# Patient Record
Sex: MALE | Race: WHITE
[De-identification: names, ages, dates, MRNs, and addresses within clinical notes are randomized per-mention and may not be internally consistent; named-entity substitution may affect disease eponyms.]

---

## 2020-06-13 ENCOUNTER — HOSPITAL ENCOUNTER (EMERGENCY)
Dept: HOSPITAL 1 - ED | Age: 50
Discharge: HOME | End: 2020-06-13
Payer: SELF-PAY

## 2020-06-13 VITALS — SYSTOLIC BLOOD PRESSURE: 122 MMHG | DIASTOLIC BLOOD PRESSURE: 47 MMHG

## 2020-06-13 VITALS
HEIGHT: 62 IN | WEIGHT: 189 LBS | WEIGHT: 189 LBS | HEIGHT: 62 IN | BODY MASS INDEX: 34.78 KG/M2 | BODY MASS INDEX: 34.78 KG/M2

## 2020-06-13 DIAGNOSIS — Z20.828: ICD-10-CM

## 2020-06-13 DIAGNOSIS — J18.9: Primary | ICD-10-CM

## 2020-06-13 LAB
ALBUMIN SERPL-MCNC: 3.6 G/DL (ref 3.4–5)
ALP SERPL-CCNC: 52 U/L (ref 46–116)
ALT SERPL-CCNC: 40 U/L (ref 16–63)
AST SERPL-CCNC: 32 U/L (ref 15–37)
BASOPHILS NFR BLD: 0.5 % (ref 0–2)
BILIRUB SERPL-MCNC: 1.1 MG/DL (ref 0.2–1)
BUN SERPL-MCNC: 16 MG/DL (ref 7–18)
CALCIUM SERPL-MCNC: 8.9 MG/DL (ref 8.5–10.1)
CHLORIDE SERPL-SCNC: 94 MMOL/L (ref 98–107)
CO2 SERPL-SCNC: 25.1 MMOL/L (ref 21–32)
CREAT SERPL-MCNC: 1.2 MG/DL (ref 0.7–1.3)
ERYTHROCYTE [DISTWIDTH] IN BLOOD BY AUTOMATED COUNT: 13 % (ref 11.5–14.5)
GFR SERPLBLD BASED ON 1.73 SQ M-ARVRAT: > 60 ML/MIN
GLUCOSE SERPL-MCNC: 109 MG/DL (ref 74–106)
PLATELET # BLD: 226 X10^3MCL (ref 130–400)
POTASSIUM SERPL-SCNC: 4 MMOL/L (ref 3.5–5.1)
PROT SERPL-MCNC: 8.7 G/DL (ref 6.4–8.2)
SODIUM SERPL-SCNC: 130 MMOL/L (ref 136–145)

## 2020-06-20 ENCOUNTER — HOSPITAL ENCOUNTER (INPATIENT)
Dept: HOSPITAL 1 - ED | Age: 50
LOS: 3 days | Discharge: HOME | DRG: 193 | End: 2020-06-23
Attending: FAMILY MEDICINE | Admitting: FAMILY MEDICINE
Payer: SELF-PAY

## 2020-06-20 VITALS
HEIGHT: 66 IN | HEIGHT: 66 IN | WEIGHT: 168 LBS | BODY MASS INDEX: 27 KG/M2 | WEIGHT: 168 LBS | BODY MASS INDEX: 27 KG/M2

## 2020-06-20 VITALS — DIASTOLIC BLOOD PRESSURE: 80 MMHG | SYSTOLIC BLOOD PRESSURE: 128 MMHG

## 2020-06-20 VITALS — DIASTOLIC BLOOD PRESSURE: 85 MMHG | SYSTOLIC BLOOD PRESSURE: 133 MMHG

## 2020-06-20 VITALS — DIASTOLIC BLOOD PRESSURE: 82 MMHG | SYSTOLIC BLOOD PRESSURE: 135 MMHG

## 2020-06-20 DIAGNOSIS — E87.1: ICD-10-CM

## 2020-06-20 DIAGNOSIS — J96.01: ICD-10-CM

## 2020-06-20 DIAGNOSIS — Z20.828: ICD-10-CM

## 2020-06-20 DIAGNOSIS — E66.8: ICD-10-CM

## 2020-06-20 DIAGNOSIS — E86.0: ICD-10-CM

## 2020-06-20 DIAGNOSIS — Z79.01: ICD-10-CM

## 2020-06-20 DIAGNOSIS — E78.5: ICD-10-CM

## 2020-06-20 DIAGNOSIS — D64.9: ICD-10-CM

## 2020-06-20 DIAGNOSIS — E44.1: ICD-10-CM

## 2020-06-20 DIAGNOSIS — I10: ICD-10-CM

## 2020-06-20 DIAGNOSIS — E11.9: ICD-10-CM

## 2020-06-20 DIAGNOSIS — J18.9: Primary | ICD-10-CM

## 2020-06-20 LAB
ALBUMIN SERPL-MCNC: 3.2 G/DL (ref 3.4–5)
ALP SERPL-CCNC: 91 U/L (ref 46–116)
ALT SERPL-CCNC: 80 U/L (ref 16–63)
AST SERPL-CCNC: 36 U/L (ref 15–37)
BASOPHILS NFR BLD: 0.3 % (ref 0–2)
BILIRUB SERPL-MCNC: 0.6 MG/DL (ref 0.2–1)
BUN SERPL-MCNC: 19 MG/DL (ref 7–18)
CALCIUM SERPL-MCNC: 9.2 MG/DL (ref 8.5–10.1)
CHLORIDE SERPL-SCNC: 98 MMOL/L (ref 98–107)
CHOLEST SERPL-MCNC: 141 MG/DL (ref ?–200)
CHOLEST/HDLC SERPL: 9.4 MG/DL
CO2 SERPL-SCNC: 23.7 MMOL/L (ref 21–32)
CREAT SERPL-MCNC: 1.1 MG/DL (ref 0.7–1.3)
CRP SERPL-MCNC: 4.9 MG/DL (ref ?–0.9)
ERYTHROCYTE [DISTWIDTH] IN BLOOD BY AUTOMATED COUNT: 13.4 % (ref 11.5–14.5)
GFR SERPLBLD BASED ON 1.73 SQ M-ARVRAT: > 60 ML/MIN
GLUCOSE SERPL-MCNC: 159 MG/DL (ref 74–106)
HDLC SERPL-MCNC: 15 MG/DL (ref 40–60)
LDH SERPL-CCNC: 251 U/L (ref 100–190)
MICROSCOPIC UR-IMP: NO
PLATELET # BLD: 530 X10^3MCL (ref 130–400)
POTASSIUM SERPL-SCNC: 4.7 MMOL/L (ref 3.5–5.1)
PROT SERPL-MCNC: 8.9 G/DL (ref 6.4–8.2)
RBC # UR STRIP.AUTO: NEGATIVE /UL
SODIUM SERPL-SCNC: 133 MMOL/L (ref 136–145)
TRIGL SERPL-MCNC: 194 MG/DL (ref ?–150)
UA SPECIFIC GRAVITY: 1.02 (ref 1–1.03)

## 2020-06-20 PROCEDURE — G0378 HOSPITAL OBSERVATION PER HR: HCPCS

## 2020-06-20 NOTE — NUR
**PLEASE ENTER FULL NAMES OF LVN/RN**
 
          Patient data collected by (LVN):PENG YAP
Assessment reviewed and completed by (RN):CORI UGALDE

## 2020-06-20 NOTE — NUR
PT IN BED AWAKE, ALERT, VERBALLY RESPONSIVE. ON TELE 3 NSR. DENIES CHEST
PAIN/PRESSURE. ON DROPLET ISOLATION FOR R/O COVID, PENDING RESULT. ON RA, IN
NO ACUTE RESPIRATORY DISTRESS, DENIES SOB. IV SITE TO RFA. DENIES PAIN OR
DISCOMFORT AT THIS TIME. BED IN LOWEST POSITION. CALL LIGHT WITHIN REACH. WILL
ENDORSE TO INCOMING SHIFT.

## 2020-06-20 NOTE — NUR
RECEIVED PT IN BED AWAKE, ALERT,ORIENTED X4. LUNG SOUNDS DIMINISHED AT THE
BASES. PT DENIED HAVING SOB ON ROOM AIR. HE HAS NO C/O PAIN. IV SITE TO RTAC
W/ NO S/S OF INFILTRATION. CALL LIGHT W/IN REACH.

## 2020-06-20 NOTE — NUR
CALLED TO TENT TO SCREEN PT. PT WEARING OWN MASK. PT WAS WEEN HERE 6/13 & DX W/
PNEUMONIA. PT WENT TO DR. JETER YESTERDAY & HAS REFERRAL SLIP TO COME TO ER "R/O
PULMONARY EMBOLISM, DX: SOB, PULSE OX 92%, TACHYCARDIA, 5 DAYS 
PRIOR TO SOB, ON EXAM: BILAT LUNGS CLEAR." PT ALSO AS ORDER SLIPS FROM SAME MD
ALSO FROM 6/19: "BILATERAL LE VENOUS DOPPER TO R/O DVT, DX: SOB, R/O PE" &
ANOTHER ONE FOR "LAB CBC, CMP, D-DIMER, CRP"
PT'S ORAL TEMP 98.2, HR PER WWCJS=132, ROOM AIR SAT 94%. KOBPQF=948#.
PT ESCORTED TO BED 15 & PLACED IN COVID PRECAUTIONS.
I RESPONDED WEARING HAIR COVER, N-95 MASK, & GLOVES.

## 2020-06-20 NOTE — NUR
RECEIVED PT FROM ER, PT ADMIT FOR COVID, PNA, PT IS A/O X4, VERBAL RESPONSIVE.
LUNG SOUND DIM SAMEER BASE, DENY ANY SOB. C/O DRY COUGH. PO2 95% IN ROOM AIR. PT
IS ON TELE 3, NSR. DENY ANY CHEST PAIN, BOWEL SOUND PRESENT ALL 4 QUADRANTS,
NO DISTENTION, ON TENDER. PEDAL PULSE PRESENT BOTH FEET, NO EDEMA, IV AT RIGHT
AC, NO LEAKING, NO INFILTRATION. ALL ADLS ASSIST, ALL NEED MET, CALL LIGHT IN
REACH, WILL CONTINUE TO MONITOR. PT CONTINUE ON DROPLET ISOLATION.

## 2020-06-21 VITALS — DIASTOLIC BLOOD PRESSURE: 63 MMHG | SYSTOLIC BLOOD PRESSURE: 114 MMHG

## 2020-06-21 VITALS — SYSTOLIC BLOOD PRESSURE: 131 MMHG | DIASTOLIC BLOOD PRESSURE: 79 MMHG

## 2020-06-21 VITALS — DIASTOLIC BLOOD PRESSURE: 72 MMHG | SYSTOLIC BLOOD PRESSURE: 124 MMHG

## 2020-06-21 VITALS — DIASTOLIC BLOOD PRESSURE: 81 MMHG | SYSTOLIC BLOOD PRESSURE: 140 MMHG

## 2020-06-21 LAB
ALBUMIN SERPL-MCNC: 2.9 G/DL (ref 3.4–5)
ALP SERPL-CCNC: 73 U/L (ref 46–116)
ALT SERPL-CCNC: 68 U/L (ref 16–63)
AST SERPL-CCNC: 31 U/L (ref 15–37)
BASOPHILS NFR BLD: 0.4 % (ref 0–2)
BILIRUB SERPL-MCNC: 0.5 MG/DL (ref 0.2–1)
BUN SERPL-MCNC: 18 MG/DL (ref 7–18)
CALCIUM SERPL-MCNC: 8.9 MG/DL (ref 8.5–10.1)
CHLORIDE SERPL-SCNC: 101 MMOL/L (ref 98–107)
CO2 SERPL-SCNC: 27.1 MMOL/L (ref 21–32)
CREAT SERPL-MCNC: 1 MG/DL (ref 0.7–1.3)
ERYTHROCYTE [DISTWIDTH] IN BLOOD BY AUTOMATED COUNT: 13.8 % (ref 11.5–14.5)
GFR SERPLBLD BASED ON 1.73 SQ M-ARVRAT: > 60 ML/MIN
GLUCOSE SERPL-MCNC: 100 MG/DL (ref 74–106)
MAGNESIUM SERPL-MCNC: 2.1 MG/DL (ref 1.8–2.4)
PHOSPHATE SERPL-MCNC: 4.1 MG/DL (ref 2.5–4.9)
PLATELET # BLD: 490 X10^3MCL (ref 130–400)
POTASSIUM SERPL-SCNC: 4.4 MMOL/L (ref 3.5–5.1)
PROT SERPL-MCNC: 7.9 G/DL (ref 6.4–8.2)
SODIUM SERPL-SCNC: 137 MMOL/L (ref 136–145)

## 2020-06-21 NOTE — NUR
RECEIVED PATIENT. IN BED, SLEEPING. NO ACUTE RESP DISTRESS NOTED. NO
COMPLAINTS OF PAIN. IV INTACT AND PATENT. SAFETY PREC IN PLACE. CALL LIGHT
WITHIN REACH. WILL CONTINUE TO MONITOR.

## 2020-06-21 NOTE — NUR
DR. HERNANDEZ MADE AWARE REGARDING BLOOD SUGAR 353 AND PATIENT WOULD LIKE TO KNOW
MORE INFORMATION REGARDING HIGH BLOOD SUGAR. PER DR. HERNANDEZ, HE WILL SPEAK
WITH THE PATIENT. WILL CONTINUE TO MONITOR.

## 2020-06-21 NOTE — NUR
PT SLEPT THROUGH THE NIGHT. HE DENIED HAVING SOB . HE REMAINS ON ROOM AIR AND
SATTING 96% AT THIS TIME. HE HAD NO C/O PAIN. ALL NEEDS ATTENDED TO.

## 2020-06-21 NOTE — NUR
SPOKE WITH DR. BRAY REGARDING PATIENT LAB VALUES CRP 3.2, ALT 68, DDIMER
834, . DR. BRAY MADE AWARE REGARDING SCHEDULED CT- ANGIO PULM ORDERED
BY DR. DOWELL. NO NEW ORDERS AT THIS TIME. WILL CONTINUE TO MONITOR.

## 2020-06-21 NOTE — NUR
RECEIVED PT IN BED RESTING QUIETLY. HE IS ALERT,ORIENTED X4. LUNG SOUNDS CLEAR
BUT DIMINISHED AT THE BASES. HE DENIED HAVING SOB. HE DENIED CHEST PAIN. PT IS
ON ROOM AIR AND SATTING 97%. W/ IV  TO RTAC INTACT AND PATENT. CALL LIGHT W/IN
REACH.

## 2020-06-22 VITALS — SYSTOLIC BLOOD PRESSURE: 148 MMHG | DIASTOLIC BLOOD PRESSURE: 87 MMHG

## 2020-06-22 VITALS — SYSTOLIC BLOOD PRESSURE: 130 MMHG | DIASTOLIC BLOOD PRESSURE: 86 MMHG

## 2020-06-22 VITALS — DIASTOLIC BLOOD PRESSURE: 75 MMHG | SYSTOLIC BLOOD PRESSURE: 120 MMHG

## 2020-06-22 VITALS — SYSTOLIC BLOOD PRESSURE: 133 MMHG | DIASTOLIC BLOOD PRESSURE: 84 MMHG

## 2020-06-22 VITALS — SYSTOLIC BLOOD PRESSURE: 120 MMHG | DIASTOLIC BLOOD PRESSURE: 75 MMHG

## 2020-06-22 VITALS — SYSTOLIC BLOOD PRESSURE: 126 MMHG | DIASTOLIC BLOOD PRESSURE: 76 MMHG

## 2020-06-22 LAB
ALBUMIN SERPL-MCNC: 3.2 G/DL (ref 3.4–5)
ALP SERPL-CCNC: 73 U/L (ref 46–116)
ALT SERPL-CCNC: 66 U/L (ref 16–63)
AST SERPL-CCNC: 30 U/L (ref 15–37)
BASOPHILS NFR BLD: 0.4 % (ref 0–2)
BILIRUB SERPL-MCNC: 0.5 MG/DL (ref 0.2–1)
BUN SERPL-MCNC: 19 MG/DL (ref 7–18)
CALCIUM SERPL-MCNC: 9.2 MG/DL (ref 8.5–10.1)
CHLORIDE SERPL-SCNC: 99 MMOL/L (ref 98–107)
CO2 SERPL-SCNC: 23.2 MMOL/L (ref 21–32)
CREAT SERPL-MCNC: 1.1 MG/DL (ref 0.7–1.3)
ERYTHROCYTE [DISTWIDTH] IN BLOOD BY AUTOMATED COUNT: 13.8 % (ref 11.5–14.5)
GFR SERPLBLD BASED ON 1.73 SQ M-ARVRAT: > 60 ML/MIN
GLUCOSE SERPL-MCNC: 117 MG/DL (ref 74–106)
LIPASE SERPL-CCNC: 266 IU/L (ref 73–393)
MAGNESIUM SERPL-MCNC: 2.2 MG/DL (ref 1.8–2.4)
PHOSPHATE SERPL-MCNC: 4.9 MG/DL (ref 2.5–4.9)
PLATELET # BLD: 470 X10^3MCL (ref 130–400)
POTASSIUM SERPL-SCNC: 4.3 MMOL/L (ref 3.5–5.1)
PROT SERPL-MCNC: 8.3 G/DL (ref 6.4–8.2)
SODIUM SERPL-SCNC: 136 MMOL/L (ref 136–145)

## 2020-06-22 NOTE — NUR
RESTING IN NO ACUTE DISTRESS. NO SOB NOTED, NO DRY COUGH. O2 SATS 98% ON RA.
VS  STABLE. HL PATENT. NO C/O PAIN OR DISCOMFORT AT THIS TIME. CALL LIGHT
WITHIN REACH.

## 2020-06-22 NOTE — NUR
PT STATED HE HAD A RESTFUL NIGHT. HE DENIED HAVING SOB . HE HAD NO C/O PAIN.
PT COUGHING RARELY. HE REMAINS ON ROOM AIR AND SATTING 94%. ALL NEEDS ATTENDED
TO.

## 2020-06-22 NOTE — NUR
DISCOUNT PHARMACY CARD AND LIST TO [facility] GIVEN TO GUICHO URIZ AND SHE WILL HAND IT PATIENT.

## 2020-06-23 VITALS — DIASTOLIC BLOOD PRESSURE: 85 MMHG | SYSTOLIC BLOOD PRESSURE: 127 MMHG

## 2020-06-23 VITALS — DIASTOLIC BLOOD PRESSURE: 79 MMHG | SYSTOLIC BLOOD PRESSURE: 122 MMHG

## 2020-06-23 VITALS — DIASTOLIC BLOOD PRESSURE: 83 MMHG | SYSTOLIC BLOOD PRESSURE: 142 MMHG

## 2020-06-23 VITALS — DIASTOLIC BLOOD PRESSURE: 84 MMHG | SYSTOLIC BLOOD PRESSURE: 133 MMHG

## 2020-06-23 LAB
ALBUMIN SERPL-MCNC: 3.2 G/DL (ref 3.4–5)
ALP SERPL-CCNC: 71 U/L (ref 46–116)
ALT SERPL-CCNC: 80 U/L (ref 16–63)
AST SERPL-CCNC: 38 U/L (ref 15–37)
BASOPHILS NFR BLD: 0.4 % (ref 0–2)
BILIRUB SERPL-MCNC: 0.62 MG/DL (ref 0.2–1)
BUN SERPL-MCNC: 19 MG/DL (ref 7–18)
CALCIUM SERPL-MCNC: 9.4 MG/DL (ref 8.5–10.1)
CHLORIDE SERPL-SCNC: 100 MMOL/L (ref 98–107)
CO2 SERPL-SCNC: 23 MMOL/L (ref 21–32)
CREAT SERPL-MCNC: 1 MG/DL (ref 0.7–1.3)
CRP SERPL-MCNC: 2.1 MG/DL (ref ?–0.9)
ERYTHROCYTE [DISTWIDTH] IN BLOOD BY AUTOMATED COUNT: 13.8 % (ref 11.5–14.5)
GFR SERPLBLD BASED ON 1.73 SQ M-ARVRAT: > 60 ML/MIN
GLUCOSE SERPL-MCNC: 122 MG/DL (ref 74–106)
MAGNESIUM SERPL-MCNC: 2.2 MG/DL (ref 1.8–2.4)
PHOSPHATE SERPL-MCNC: 4 MG/DL (ref 2.5–4.9)
PLATELET # BLD: 477 X10^3MCL (ref 130–400)
POTASSIUM SERPL-SCNC: 4.3 MMOL/L (ref 3.5–5.1)
PROT SERPL-MCNC: 8.4 G/DL (ref 6.4–8.2)
SODIUM SERPL-SCNC: 134 MMOL/L (ref 136–145)

## 2020-06-23 NOTE — NUR
DISCHARGE DONE. REVIEWED DISCHARGE PACKET, F/U INSTRUCTIONS AND DIABETIC
EDUCATIONAL PACKET W/ PT, PT VERBALIZED UNDERSTANDING OF INSTRUCTIONS. PT
AOX4, RESP E/U ON RA, VS STABLE, DENIES PAIN AT THIS TIME. IV TO RAC REMOVED,
CATH INTACT, GAUZE APPLIED TO SITE. TELE 3 COLLECTED AND RETURNED TO MONITOR
TECH. PT ESCORTED TO LOBBY VIA WHEELCHAIR W/ NO ACUTE INCIDENCE.

## 2020-06-23 NOTE — NUR
PT SEEN AT BEDSIDE. PT AOX4, FOLLOWS COMMANDS SPEECH CLEAR, PERRLA, DENIES
HEADACHE. ON TELE 3 SHOWING SR, HR: 73, S1 AND S2 WNL, DENIES CHEST PAIN. RESP
E/U ON RA, EQUAL CHEST RISE, LUNGS CTA, DENIES SOB/COUGH. IV SALINE LOCK TO
RAC W/ NO ERYTHEMA/EDEMA. BED IN LOWEST POSITION AND CALL LIGHT WITHIN REACH.
DROPLET PRECAUTIONS MAINTAINED. WILL CONTINUE TO MONITOR.